# Patient Record
Sex: MALE | Race: WHITE | NOT HISPANIC OR LATINO | Employment: FULL TIME | URBAN - METROPOLITAN AREA
[De-identification: names, ages, dates, MRNs, and addresses within clinical notes are randomized per-mention and may not be internally consistent; named-entity substitution may affect disease eponyms.]

---

## 2017-01-10 ENCOUNTER — TRANSCRIBE ORDERS (OUTPATIENT)
Dept: LAB | Facility: CLINIC | Age: 47
End: 2017-01-10

## 2017-01-10 ENCOUNTER — APPOINTMENT (OUTPATIENT)
Dept: LAB | Facility: CLINIC | Age: 47
End: 2017-01-10
Payer: COMMERCIAL

## 2017-01-10 DIAGNOSIS — E78.00 PURE HYPERCHOLESTEROLEMIA: ICD-10-CM

## 2017-01-10 DIAGNOSIS — Z12.81: Primary | ICD-10-CM

## 2017-01-10 LAB — VENIPUNCTURE: NORMAL

## 2017-01-10 PROCEDURE — 36415 COLL VENOUS BLD VENIPUNCTURE: CPT

## 2021-08-13 ENCOUNTER — APPOINTMENT (EMERGENCY)
Dept: RADIOLOGY | Facility: HOSPITAL | Age: 51
End: 2021-08-13
Payer: COMMERCIAL

## 2021-08-13 ENCOUNTER — HOSPITAL ENCOUNTER (EMERGENCY)
Facility: HOSPITAL | Age: 51
Discharge: HOME | End: 2021-08-13
Attending: EMERGENCY MEDICINE | Admitting: EMERGENCY MEDICINE
Payer: COMMERCIAL

## 2021-08-13 VITALS
HEIGHT: 68 IN | RESPIRATION RATE: 19 BRPM | TEMPERATURE: 98.5 F | SYSTOLIC BLOOD PRESSURE: 165 MMHG | BODY MASS INDEX: 26.83 KG/M2 | HEART RATE: 90 BPM | OXYGEN SATURATION: 97 % | DIASTOLIC BLOOD PRESSURE: 76 MMHG | WEIGHT: 177 LBS

## 2021-08-13 DIAGNOSIS — G51.0 BELL'S PALSY: Primary | ICD-10-CM

## 2021-08-13 LAB — GLUCOSE SERPL-MCNC: 99 MG/DL (ref 65–140)

## 2021-08-13 PROCEDURE — 99284 EMERGENCY DEPT VISIT MOD MDM: CPT

## 2021-08-13 PROCEDURE — 82948 REAGENT STRIP/BLOOD GLUCOSE: CPT

## 2021-08-13 PROCEDURE — 99284 EMERGENCY DEPT VISIT MOD MDM: CPT | Performed by: EMERGENCY MEDICINE

## 2021-08-13 PROCEDURE — 70450 CT HEAD/BRAIN W/O DYE: CPT

## 2021-08-13 RX ORDER — ROSUVASTATIN CALCIUM 10 MG/1
10 TABLET, COATED ORAL DAILY
COMMUNITY

## 2021-08-13 RX ORDER — ALPRAZOLAM 0.25 MG/1
TABLET ORAL AS NEEDED
COMMUNITY

## 2021-08-13 RX ORDER — PREDNISONE 20 MG/1
TABLET ORAL
Qty: 18 TABLET | Refills: 0 | Status: SHIPPED | OUTPATIENT
Start: 2021-08-13 | End: 2021-08-21

## 2021-08-13 RX ORDER — MINERAL OIL AND PETROLATUM 150; 830 MG/G; MG/G
OINTMENT OPHTHALMIC
Qty: 3.5 G | Refills: 0 | Status: SHIPPED | OUTPATIENT
Start: 2021-08-13 | End: 2022-03-15

## 2021-08-13 RX ORDER — SODIUM CHLORIDE 5 %
1 OINTMENT (GRAM) OPHTHALMIC (EYE) DAILY
Qty: 3.5 G | Refills: 0 | Status: SHIPPED | OUTPATIENT
Start: 2021-08-13 | End: 2021-08-13

## 2021-08-13 RX ADMIN — PREDNISONE 50 MG: 20 TABLET ORAL at 19:23

## 2021-08-15 NOTE — ED PROVIDER NOTES
Final Diagnosis:  1  Bell's palsy        Chief Complaint   Patient presents with    Facial Droop     Pt reports at about 1-2pm today noticing toungue feeling numb on right side and right side of mouth noted "weak"  HPI  Patient presents with right facial droop  Started 5 hours prior  No headache or trauma  No single sided weakness  No sensory deficit on his exam  Wrinkles of forehead smoothed, believe to represent Bell's palsy, but subtle on the forehead  Because of uncertainty, stroke alert  strokologist calls and able to view images (see media) and agrees looks like Bell's  Absence of any other neuro deficit on NIH stroke scale and exam  No ear complaints  No known tick bites  - No language barrier    - History obtained from patient  - There are no limitations to the history obtained  - Previous charting underwent limited review with attention to last ED visits, labs, ekgs, and prior imaging  PMH:   has a past medical history of Anxiety and Hypercholesteremia  PSH:   has a past surgical history that includes Cyst Removal and Knee surgery  ROS:  Review of Systems   Constitutional: Negative for chills and fever  HENT: Negative for ear pain and sore throat  Eyes: Negative for pain and visual disturbance  Respiratory: Negative for cough and shortness of breath  Cardiovascular: Negative for chest pain and palpitations  Gastrointestinal: Negative for abdominal pain and vomiting  Genitourinary: Negative for dysuria and hematuria  Musculoskeletal: Negative for arthralgias and back pain  Skin: Negative for color change and rash  Neurological: Positive for facial asymmetry and numbness  Negative for seizures and syncope  All other systems reviewed and are negative         PE:     Physical exam highlights:   Physical Exam   See Nichelle Luevano    Stroke Assessment     Row Name 08/13/21 3553             NIH Stroke Scale    Interval  Baseline      Level of Consciousness (1a )  0      LOC Questions (1b )  0      LOC Commands (1c )  0      Best Gaze (2 )  0      Visual (3 )  0      Facial Palsy (4 )  2      Motor Arm, Left (5a )  0      Motor Arm, Right (5b )  0      Motor Leg, Left (6a )  0      Motor Leg, Right (6b )  0      Limb Ataxia (7 )  0      Sensory (8 )  0      Best Language (9 )  0      Dysarthria (10 )  0      Extinction and Inattention (11 ) (Formerly Neglect)  0      Total  2              Vitals:    08/13/21 1818 08/13/21 1833 08/13/21 1845   BP: (!) 183/95 159/76 165/76   BP Location: Right arm Left arm    Pulse: 85 105 90   Resp: 18 (!) 24 19   Temp:  98 5 °F (36 9 °C)    TempSrc: Tympanic Tympanic    SpO2: 98% 98% 97%   Weight: 80 3 kg (177 lb)     Height: 5' 8" (1 727 m)       Vitals reviewed by me  Nursing note reviewed  Chaperone present for all sensitive exam   Const: No acute distress  Alert  Nontoxic  Not diaphoretic  HEENT: External ears normal  No protrusion drainage swelling  Nose normal  No drainage/traumatic deformity  MMM  Mouth with baseline/symmetric movement  No trismus  Eyes: No squinting  No icterus  Tracks through the room with normal EOM  No tearing/swelling/drainage  Neck: ROM normal  No rigidity  No meningismus  Cards: Rate as per vitals  Compared to monitor sinus unless documented above  Regular  Well perfused  Pulm: able to verbalize without additional effort  Effort and excursion normal  No disress  No audible wheezing/ stridor  Normal resp rate  Abd: No distension beyond baseline  No fluctuant wave  Patient without peritoneal pain with shifting/bumping the bed  MSK: ROM normal and baseline  No deformity  Skin: No new rashes visible  Well perfused  Neuro: Nonfocal  Baseline  CN grossly intact  Moving all four with coordination  Psych: Normal behavior and affect  A:  - Nursing note reviewed      Ddx and MDM  Bell's vs Stroke  Strokologist and myself agree - cancel stroke alert  Jessica Mclaughlin CT unremarkable  Will treat with steroids, f/u with neuro                   ED Course as of Aug 15 1654   Fri Aug 13, 2021   1900 Spoke with neurologist on call  Agree about Garcia's  Will cancel stroke alert and obtain CTH        CT head without contrast   Final Result      No evidence of acute vascular territorial infarction, intracranial hemorrhage or mass effect  I personally discussed this study with Chiqui Pinto on 8/13/2021 at 6:55 PM                            Workstation performed: [id number]           Orders Placed This Encounter   Procedures    CT head without contrast     Labs Reviewed   POCT GLUCOSE - Normal       Result Value Ref Range Status    POC Glucose 99  65 - 140 mg/dl Final       Final Diagnosis:  1  Bell's palsy        P:  - hospital tx includes prednisone  - disposition home  - additional tx intended, if consistent with primary provider steroids  - patient to follow with neuro   - patient will call their PCP to let them know they were in the emergency department  We discuss return precautions     Medications   predniSONE tablet 50 mg (50 mg Oral Given 8/13/21 1923)     Time reflects when diagnosis was documented in both MDM as applicable and the Disposition within this note     Time User Action Codes Description Comment    8/13/2021  7:13 PM Laquita Spears Add [G51 0] Bell's palsy       ED Disposition     ED Disposition Condition Date/Time Comment    Discharge Stable Fri Aug 13, 2021  7:13 PM Lisandro Esteban discharge to home/self care              Follow-up Information     Follow up With Specialties Details Why Contact Info Additional [facility] Schedule an appointment as soon as possible for a visit   [address] [address]  [address] [facility], [address], [place], [place], [address] [phone]        Discharge Medication List as of 8/13/2021  7:22 PM      START taking these medications    Details artificial tear (LUBRIFRESH P M ) 83-15 % ophthalmic ointment Administer to the right eye daily at bedtime, Starting Fri 8/13/2021, Normal      predniSONE 20 mg tablet Multiple Dosages:Starting Fri 8/13/2021, Until Mon 8/16/2021 at 2359, THEN Starting Tue 8/17/2021, Until Wed 8/18/2021 at 2359, THEN Starting Thu 8/19/2021, Until Fri 8/20/2021 at 2359Take 3 tablets (60 mg total) by mouth daily for 4 days, THEN 2 tab lets (40 mg total) daily for 2 days, THEN 1 tablet (20 mg total) daily for 2 days  , Normal         CONTINUE these medications which have NOT CHANGED    Details   ALPRAZolam (XANAX) 0 25 mg tablet Take by mouth as needed for anxiety, Historical Med      Icosapent Ethyl (VASCEPA PO) Take by mouth daily, Historical Med      rosuvastatin (Crestor) 10 MG tablet Take 10 mg by mouth daily, Historical Med           No discharge procedures on file  Prior to Admission Medications   Prescriptions Last Dose Informant Patient Reported? Taking? ALPRAZolam (XANAX) 0 25 mg tablet   Yes Yes   Sig: Take by mouth as needed for anxiety   Icosapent Ethyl (VASCEPA PO)   Yes Yes   Sig: Take by mouth daily   rosuvastatin (Crestor) 10 MG tablet   Yes Yes   Sig: Take 10 mg by mouth daily      Facility-Administered Medications: None       Portions of the record may have been created with voice recognition software  Occasional wrong word or "sound a like" substitutions may have occurred due to the inherent limitations of voice recognition software  Read the chart carefully and recognize, using context, where substitutions have occurred      Electronically signed by:  MD Akil Xavier MD  08/15/21 7606

## 2021-08-19 ENCOUNTER — CONSULT (OUTPATIENT)
Dept: NEUROLOGY | Facility: CLINIC | Age: 51
End: 2021-08-19
Payer: COMMERCIAL

## 2021-08-19 VITALS
BODY MASS INDEX: 26.52 KG/M2 | HEIGHT: 68 IN | HEART RATE: 69 BPM | WEIGHT: 175 LBS | DIASTOLIC BLOOD PRESSURE: 94 MMHG | TEMPERATURE: 98.1 F | SYSTOLIC BLOOD PRESSURE: 153 MMHG

## 2021-08-19 DIAGNOSIS — G51.0 RIGHT-SIDED BELL'S PALSY: Primary | ICD-10-CM

## 2021-08-19 PROCEDURE — 99203 OFFICE O/P NEW LOW 30 MIN: CPT | Performed by: PSYCHIATRY & NEUROLOGY

## 2021-08-19 RX ORDER — FAMCICLOVIR 500 MG/1
500 TABLET, FILM COATED ORAL EVERY 8 HOURS
COMMUNITY
Start: 2021-08-17 | End: 2022-03-15

## 2021-08-19 RX ORDER — MULTIVITAMIN
1 CAPSULE ORAL DAILY
COMMUNITY

## 2021-08-19 NOTE — PROGRESS NOTES
Outpatient Neurology History and Physical  Rose Silva  [id number]  61 y o   1970          Consult: Yes    Maria Eugenia Espinoza,       CC: [facility]         History Obtained from: patient     HPI:     Rose Silva is a 53 yo M with PMH of HLD presents for evaluation of bell's palsy  On 8/13/21, patient gradually noted weakness to right side of mouth around lunch time  He felt his tongue being numb  He has hard time closing his mouth while eating, drinking  He can't close his eyelid  He noticed cold sore inside of right nostril also around the same time  He had gone to ED and was given course of prednisone 60mg for 4 d, 40mg for 2d, 20mg for 1d  His PCP has written course of antiviral for 10 days  He denies any fever, chills  He had Lyme ab drawn yesterday  In early Aug, he had gone to [place] and [place]  for vacation but doesn't recall being in woods  He mainly stayed by beach  Past Medical History:   Diagnosis Date    Anxiety     Hypercholesteremia     Right-sided Bell's palsy                Current Outpatient Medications on File Prior to Visit   Medication Sig Dispense Refill    ALPRAZolam (XANAX) 0 25 mg tablet Take by mouth as needed for anxiety      artificial tear (LUBRIFRESH P M ) 83-15 % ophthalmic ointment Administer to the right eye daily at bedtime 3 5 g 0    famciclovir (FAMVIR) 500 mg tablet Take 500 mg by mouth every 8 (eight) hours      Icosapent Ethyl (VASCEPA PO) Take by mouth daily      Multiple Vitamin (multivitamin) capsule Take 1 capsule by mouth daily      predniSONE 20 mg tablet Take 3 tablets (60 mg total) by mouth daily for 4 days, THEN 2 tablets (40 mg total) daily for 2 days, THEN 1 tablet (20 mg total) daily for 2 days   18 tablet 0    rosuvastatin (Crestor) 10 MG tablet Take 10 mg by mouth daily      [DISCONTINUED] sodium chloride (PAULA 128) 5 % hypertonic ophthalmic ointment Administer 1 drop to the right eye daily 3 5 g 0     No current facility-administered medications on file prior to visit  No Known Allergies      History reviewed  No pertinent family history  Past Surgical History:   Procedure Laterality Date    CYST REMOVAL      KNEE SURGERY             Social History     Socioeconomic History    Marital status: /Civil Union     Spouse name: Not on file    Number of children: Not on file    Years of education: Not on file    Highest education level: Not on file   Occupational History    Not on file   Tobacco Use    Smoking status: Former Smoker    Smokeless tobacco: Never Used   Vaping Use    Vaping Use: Never used   Substance and Sexual Activity    Alcohol use: Yes     Comment: rare    Drug use: Not Currently    Sexual activity: Not on file   Other Topics Concern    Not on file   Social History Narrative    Not on file     Social Determinants of Health     Financial Resource Strain:     Difficulty of Paying Living Expenses:    Food Insecurity:     Worried About Running Out of Food in the Last Year:     [address] in the Last Year:    Transportation Needs:     Lack of Transportation (Medical):      Lack of Transportation (Non-Medical):    Physical Activity:     Days of Exercise per Week:     Minutes of Exercise per Session:    Stress:     Feeling of Stress :    Social Connections:     Frequency of Communication with Friends and Family:     Frequency of Social Gatherings with Friends and Family:     Attends [employer] Services:     Active Member of Clubs or Organizations:     Attends Club or Organization Meetings:     Marital Status:    Intimate Partner Violence:     Fear of Current or Ex-Partner:     Emotionally Abused:     Physically Abused:     Sexually Abused:        Review of Systems  Refer to positive review of systems in HPI  Constitutional- No fever  Eyes- No visual change  ENT- Hearing normal  CV- No chest pain  Resp- No Shortness of breath  GI- No diarrhea  - Bladder normal  MS- No Arthritis Skin- No rash  Psych- No depression  Endo- No DM  Heme- No nodes    PHYSICAL EXAM:    Vitals:    08/19/21 1020   BP: 153/94   BP Location: Left arm   Patient Position: Sitting   Cuff Size: Adult   Pulse: 69   Temp: 98 1 °F (36 7 °C)   Weight: 79 4 kg (175 lb)   Height: 5' 8" (1 727 m)         Appearance: No Acute Distress  Ophthalmoscopic: Disc Flat, Normal fundus  Carotid/Heart/Peripheral Vascular: No Bruits, RRR  Orientation: Awake, Alert, and Oriented x 3  Mental status:  Memory: Registation 3/3 Recall 3/3  Attention: Normal  Knowledge: Appropriate  Language: No aphasia  Speech: No dysarthria  Cranial Nerves:  2 No Visual Defect on Confrontation; Pupils round, equal, reactive to light  3,4,6 Extraocular Movements Intact; no nystagmus  5 Facial Sensation Intact  7 right facial peripheral asymmetry  Can't close right eyelid  Can't make wrinkles to right side of forehead  Right buccal muscle weakness  8 Intact hearing  9,10 Palate symmetric, normal gag  11 Good shoulder shrug  12 Tongue Midline  Gait: Stable, No ataxia, can perform tandem walking  Coordination: No ataxia with finger to nose testing and heel to shin testing  Sensory: Intact, Symmetric to Pinprick, Light Touch, Vibration, and Joint Position  Muscle Tone: Normal  Muscle exam  Arm Right Left Leg Right Left   Deltoid 5/5 5/5 Iliopsoas 5/5 5/5   Biceps 5/5 5/5 Quads 5/5 5/5   Triceps 5/5 5/5 Hamstrings 5/5 5/5   Wrist Extension 5/5 5/5 Ankle Dorsi Flexion 5/5 5/5   Wrist Flexion 5/5 5/5 Ankle Plantar Flexion 5/5 5/5   Interossei 5/5 5/5 Ankle Eversion 5/5 5/5   APB 5/5 5/5 Ankle Inversion 5/5 5/5       Reflexes   RJ BJ TJ KJ AJ Plantars Jackson's   Right 2+ 2+ 2+ 2+ 2+ Downgoing Not present   Left 2+ 2+ 2+ 2+ 2+ Downgoing Not present           Personal review of           Assessment/Plan:     1  Right-sided Bell's palsy         Patient appears to have classic peripheral right Bell's palsy  He already has prednisone and antiviral course prescribed  He's arranged to start PT  Discussed minimizing stress level for better recovery  He's using artificial tears, lacrilube  He tapes his eyelid shut at night  Discussed ok to use clean eye patch if easier  If should recover fully within next 2 months  If not, then will consider obtaining MRI brain  At present, it won't   Counseling Documentation:  The patient and/or patient's family were  counseled regarding diagnostic results  Instructions for management,risk factor reductions,prognosis of disease were discussed  Patient and family were educated regarding impressions,risks and benefits of treatment options,importance of compliance with treatment  Total time of encounter: 35 min  More than 50% of time was spent in counseling and coordination of care of patient  DANITZA Grewal [facility]  [address]  [address]

## 2021-08-24 ENCOUNTER — EVALUATION (OUTPATIENT)
Dept: PHYSICAL THERAPY | Facility: CLINIC | Age: 51
End: 2021-08-24
Payer: COMMERCIAL

## 2021-08-24 DIAGNOSIS — G51.0 BELL'S PALSY: Primary | ICD-10-CM

## 2021-08-24 PROCEDURE — 97162 PT EVAL MOD COMPLEX 30 MIN: CPT

## 2021-08-24 PROCEDURE — 97032 APPL MODALITY 1+ESTIM EA 15: CPT

## 2021-08-24 NOTE — PROGRESS NOTES
PT Evaluation     Today's date: 2021  Patient name: Tobin Still  : 1970  MRN: [id number]  Referring provider: Tamiko Damico DO  Dx:   Encounter Diagnosis     ICD-10-CM    1  Bell's palsy  G51 0        Start Time: 1098  Stop Time: 1335  Total time in clinic (min): 65 minutes    Assessment  Assessment details: 54year old male with recent onset of Bell's Palsy R, displays mild facial drooping at rest   Pt presents with significant weakness of facial muscles as outlined in objective section  Pt is using tape at night, eye drops for eye health  Pt must use caution with eating due to poor movement of food on the right side  Home exercise program and suggestions were issued today, see copy in media  Advise continued PT for active motion training for muscles that are firing at least trace, and ES for muscles not activly firing  Pt in agreement with this plan to continue PT once weekly  Impairments: abnormal muscle firing, abnormal muscle tone, abnormal or restricted ROM, impaired physical strength and lacks appropriate home exercise program  Understanding of Dx/Px/POC: excellent  Goals  STG 1-4 weeks    1  Pt will be independent in early HEP  2  Pt will begin to chew some foods on the right safely  LTG 4-8 weeks   1  Pt will be able to hold large puff of air in cheeks without leakage  2 Pt will be independent with advanced HEP  3  3/5 muscle strength for all facial muslces  4  Near normal resting facial symmetry  5  Pt will masticate and move food normally for safe eating         Plan  Planned modality interventions: manual electrical stimulation  Planned therapy interventions: manual therapy, massage, ADL retraining, activity modification, home exercise program, therapeutic exercise, therapeutic activities, neuromuscular re-education and patient education  Frequency: 1x week  Duration in weeks: 12  Plan of Care beginning date: 2021  Plan of Care expiration date: 2021  Treatment plan discussed with: patient        Subjective Evaluation    History of Present Illness  Mechanism of injury: 54year old male reports onset of Bell's Palsy 21  He first noted numbness on half of his tongue, and facial weakness  He went to the ER, CT negative for stroke  Steroids were initiated  2-3 days later he went to PCP  Antiviral was started  He has been seen by Neurology, Dr Rayna Howe  Eating: small pieces of food  He cannot bite into a sandwich  He is eating soft foods  Drinking from a cup: drooling if he is not careful  Straw:   Must position it on the left side  Talking: feels he has a minor slur  Eye closure:  Not complete  Nightime: eye drops during the day  Night taping his eye closed  Pain less after steroids  After completion of steroids, he notes increase in pain/stabbing sensation behind and inside  R  ear  Advil reduces it  Sensation: mild numbness persists tongue R side     Dizziness:  None  Imbalance: none     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    Pain  Current pain ratin  At best pain ratin  At worst pain ratin  Quality: needle-like and pressure  Relieving factors: medications    Treatments  No previous or current treatments  Patient Goals  Patient goal: restore facial muscle movement        Objective     Functional Assessment        Comments  Resting symmetry:  Right side of mouth lower than L, R eyebrow slighly lower  Pt wearing a breath-rite strip, feels R nostril is slightly collapsed  Muscle activation    Frontalis: slight activation, slight movement R eyebrow  Eye closurse : r eye remains open 1/8-1/4 of inch  Tolerates no resistance   Smile lips closed:  No movement noted corner of R mouth  Smile lips open:  No R teeth visible  Pout: bottom lip moves symmetrical, appears full R vs L, but upper lip no movement  Puff of air in cheeks: small amount of air held only    Flare nostril: no activation R  Tongue: protrudes slightly R     EStim 5s x 10 reps to:  Frontalis  Orbicularis oris  Flare of nostril  Furrow eyebrows               Precautions:   Past Medical History:   Diagnosis Date    Anxiety     Hypercholesteremia     Right-sided Bell's palsy          Manuals                                                                 Neuro Re-Ed                                                                                                        Ther Ex                                                                                                                     Ther Activity                                       Gait Training                                       Modalities

## 2021-08-24 NOTE — LETTER
2021    Maria Eugenia Espinoza, [address] [address]    Patient: Rose Silva   YOB: 1970   Date of Visit: 2021     Encounter Diagnosis     ICD-10-CM    1  Bell's palsy  G51 0        Dear Dr Yuliya Hernandez: Thank you for your recent referral of Rose Silva  Please review the attached evaluation summary from [address] [place] [address] recent visit  Please verify that you agree with the plan of care by signing the attached order  If you have any questions or concerns, please do not hesitate to call  I sincerely appreciate the opportunity to share in the care of one of your patients and hope to have another opportunity to work with you in the near future  Sincerely,    Sherlyn Streeter, PT      Referring Provider:      I certify that I have read the below Plan of Care and certify the need for these services furnished under this plan of treatment while under my care  Maria Eugenia Espinoza, [address] [address]  Via Fax: [phone]            PT Evaluation     Today's date: 2021  Patient name: Rose Silva  : 1970  MRN: [id number]  Referring provider: Itzel Vargas DO  Dx:   Encounter Diagnosis     ICD-10-CM    1  Bell's palsy  G51 0        Start Time: 6870  Stop Time: 1335  Total time in clinic (min): 65 minutes    Assessment  Assessment details: 54year old male with recent onset of Bell's Palsy R, displays mild facial drooping at rest   Pt presents with significant weakness of facial muscles as outlined in objective section  Pt is using tape at night, eye drops for eye health  Pt must use caution with eating due to poor movement of food on the right side  Home exercise program and suggestions were issued today, see copy in media  Advise continued PT for active motion training for muscles that are firing at least trace, and ES for muscles not activly firing    Pt in agreement with this plan to continue PT once weekly  Impairments: abnormal muscle firing, abnormal muscle tone, abnormal or restricted ROM, impaired physical strength and lacks appropriate home exercise program  Understanding of Dx/Px/POC: excellent  Goals  STG 1-4 weeks    1  Pt will be independent in early HEP  2  Pt will begin to chew some foods on the right safely  LTG 4-8 weeks   1  Pt will be able to hold large puff of air in cheeks without leakage  2 Pt will be independent with advanced HEP  3  3/5 muscle strength for all facial muslces  4  Near normal resting facial symmetry  5  Pt will masticate and move food normally for safe eating  Plan  Planned modality interventions: manual electrical stimulation  Planned therapy interventions: manual therapy, massage, ADL retraining, activity modification, home exercise program, therapeutic exercise, therapeutic activities, neuromuscular re-education and patient education  Frequency: 1x week  Duration in weeks: 12  Plan of Care beginning date: 8/24/2021  Plan of Care expiration date: 11/22/2021  Treatment plan discussed with: patient        Subjective Evaluation    History of Present Illness  Mechanism of injury: 54year old male reports onset of Bell's Palsy 8/13/21  He first noted numbness on half of his tongue, and facial weakness  He went to the ER, CT negative for stroke  Steroids were initiated  2-3 days later he went to PCP  Antiviral was started  He has been seen by Neurology, Dr Fidelia Vizcarra  Eating: small pieces of food  He cannot bite into a sandwich  He is eating soft foods  Drinking from a cup: drooling if he is not careful  Straw:   Must position it on the left side  Talking: feels he has a minor slur  Eye closure:  Not complete  Nightime: eye drops during the day  Night taping his eye closed  Pain less after steroids  After completion of steroids, he notes increase in pain/stabbing sensation behind and inside  R  ear  Advil reduces it     Sensation: mild numbness persists tongue R side     Dizziness:  None  Imbalance: none     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    Pain  Current pain ratin  At best pain ratin  At worst pain ratin  Quality: needle-like and pressure  Relieving factors: medications    Treatments  No previous or current treatments  Patient Goals  Patient goal: restore facial muscle movement        Objective     Functional Assessment        Comments  Resting symmetry:  Right side of mouth lower than L, R eyebrow slighly lower  Pt wearing a breath-rite strip, feels R nostril is slightly collapsed  Muscle activation    Frontalis: slight activation, slight movement R eyebrow  Eye closurse : r eye remains open 1/8-1/4 of inch  Tolerates no resistance   Smile lips closed:  No movement noted corner of R mouth  Smile lips open:  No R teeth visible  Pout: bottom lip moves symmetrical, appears full R vs L, but upper lip no movement  Puff of air in cheeks: small amount of air held only    Flare nostril: no activation R  Tongue: protrudes slightly R     EStim 5s x 10 reps to:  Frontalis  Orbicularis oris  Flare of nostril  Furrow eyebrows               Precautions:   Past Medical History:   Diagnosis Date    Anxiety     Hypercholesteremia     Right-sided Bell's palsy          Manuals                                                                 Neuro Re-Ed                                                                                                        Ther Ex                                                                                                                     Ther Activity                                       Gait Training                                       Modalities

## 2021-08-30 ENCOUNTER — APPOINTMENT (OUTPATIENT)
Dept: PHYSICAL THERAPY | Facility: CLINIC | Age: 51
End: 2021-08-30
Payer: COMMERCIAL

## 2021-09-28 NOTE — PROGRESS NOTES
PT DC NOTE  Pt called to cancel remaining PT appointments, stating he was transferring to an [facility] location closer to home  He attended PT evaluation only at this location  Continued PT was advised

## 2022-01-14 ENCOUNTER — TELEPHONE (OUTPATIENT)
Dept: HEMATOLOGY ONCOLOGY | Facility: CLINIC | Age: 52
End: 2022-01-14

## 2022-01-14 NOTE — TELEPHONE ENCOUNTER
New Patient Intake Form   Patient Details:    Sharon Osman  1970  [id number]    Appointment Information   Who is calling to schedule? Spouse   If not self, what is the caller's name? Please put name of [facility] nurse as well  Pio Gandhi   Referring provider Dr Glynn   What is the diagnosis? Abnormal labs   Is there a confirmed tissue diagnosis? No   Is patient aware of diagnosis? Yes   Have you had any imaging or labs done? If yes, where? (If imaging done outside of [facility], please remind patient to bring a disk ) Yes     [facility]        Have you been seen by another Oncologist/Hematologist?  If so, who and where? no   Are the records in [facility] or Care Everywhere? no   Are records needed from an outside facility? Yes    If yes, Name of facility, city and state where facility is located  Dr Zaira Samson   Is the patient willing to be seen by another provider?   (This is for breast patients only) n/a   Miscellaneous Information:    Dr Glynn's office will be faxing over most recent note and labs over to [phone] appt made for 02/08

## 2022-01-25 ENCOUNTER — DOCUMENTATION (OUTPATIENT)
Dept: HEMATOLOGY ONCOLOGY | Facility: MEDICAL CENTER | Age: 52
End: 2022-01-25

## 2022-01-26 ENCOUNTER — CONSULT (OUTPATIENT)
Dept: HEMATOLOGY ONCOLOGY | Facility: MEDICAL CENTER | Age: 52
End: 2022-01-26
Payer: COMMERCIAL

## 2022-01-26 VITALS
RESPIRATION RATE: 18 BRPM | BODY MASS INDEX: 26.52 KG/M2 | DIASTOLIC BLOOD PRESSURE: 82 MMHG | TEMPERATURE: 97.3 F | SYSTOLIC BLOOD PRESSURE: 122 MMHG | HEIGHT: 68 IN | OXYGEN SATURATION: 98 % | HEART RATE: 87 BPM | WEIGHT: 175 LBS

## 2022-01-26 DIAGNOSIS — R89.9 ABNORMAL LABORATORY TEST: Primary | ICD-10-CM

## 2022-01-26 PROCEDURE — 99204 OFFICE O/P NEW MOD 45 MIN: CPT | Performed by: INTERNAL MEDICINE

## 2022-01-26 NOTE — PROGRESS NOTES
Anthony Castle  1970  [facility] [address] [address]  HEMATOLOGY/ONCOLOGY CONSULTATION REPORT    DISCUSSION/SUMMARY:    57-year-old male with history of IBS, present issues with constipation recently undergoing blood work demonstrating an abnormal albumin/globulin ratio  The albumin level, globulin level and total protein level are otherwise within normal limits  Mr Livia Cuadra feels relatively well; clinically there are no concerning findings  From a hematology standpoint, no additional workup is needed  Patient and Dr Vikash Cobos can periodically recheck the CMP looking for abnormal progression  Patient has a pending appointment with GI; Mr Livia Cuadra understands that this appointment is very important - the constipation needs to be improved/reversed so that patient does not continue to have blood in his stools  Return to Hematology is on a prn basis but Mr Livia Cuadra knows to call the hematology/oncology office if there are any other questions or concerns  Carefully review your medication list and verify that the list is accurate and up-to-date  Please call the hematology/oncology office if there are medications missing from the list, medications on the list that you are not currently taking or if there is a dosage or instruction that is different from how you're taking that medication  Patient goals and areas of care: Follow-up with PCP  Barriers to care:  None  Patient is able to self-care   ______________________________________________________________________________________    Chief Complaint   Patient presents with    Consult     Abnormal laboratory test results     History of Present Illness:  57-year-old male referred for an abnormal albumin/globulin ratio  Presently Mr Livia Cuadra states feeling okay, patient's biggest complaint is chronic constipation  Patient has a history of IBS    Occasionally Mr Livia Cuadra sees blood in his stools from pushing  Appetite is otherwise good, weight is stable  No  issues  Activities are baseline  No headaches, blurred vision or dizziness, no respiratory issues  Routine health maintenance and medical care is otherwise up-to-date  Review of Systems   Constitutional: Negative  HENT: Negative  Eyes: Negative  Respiratory: Negative  Cardiovascular: Negative  Gastrointestinal: Positive for constipation  Endocrine: Negative  Genitourinary: Negative  Musculoskeletal: Negative  Skin: Negative  Allergic/Immunologic: Negative  Neurological: Negative  Hematological: Negative  Psychiatric/Behavioral: Negative  All other systems reviewed and are negative  There is no problem list on file for this patient      Past Medical History:   Diagnosis Date    Anxiety     Hypercholesteremia     Right-sided Bell's palsy      Past Surgical History:   Procedure Laterality Date    CYST REMOVAL      KNEE SURGERY Right 2018    infection     Family History   Adopted: Yes   Problem Relation Age of Onset    Tuberculosis Mother    Family history:  No known familial or genetic diseases    Social History     Socioeconomic History    Marital status: /Civil Union     Spouse name: Not on file    Number of children: Not on file    Years of education: Not on file    Highest education level: Not on file   Occupational History    Not on file   Tobacco Use    Smoking status: Former Smoker     Quit date: 8/24/2006     Years since quitting: 15 4    Smokeless tobacco: Never Used   Vaping Use    Vaping Use: Never used   Substance and Sexual Activity    Alcohol use: Yes     Comment: rare    Drug use: Not Currently    Sexual activity: Not on file   Other Topics Concern    Not on file   Social History Narrative    Not on file     Social Determinants of Health     Financial Resource Strain: Not on file   Food Insecurity: Not on file   Transportation Needs: Not on file   Physical Activity: Not on file   Stress: Not on file   Social Connections: Not on file   Intimate Partner Violence: Not on file   Housing Stability: Not on file   Social history:  Discontinued tobacco use 25 years ago, no drug or alcohol abuse, no toxic exposure    Current Outpatient Medications:     ALPRAZolam (XANAX) 0 25 mg tablet, Take by mouth as needed for anxiety, Disp: , Rfl:     artificial tear (LUBRIFRESH P M ) 83-15 % ophthalmic ointment, Administer to the right eye daily at bedtime, Disp: 3 5 g, Rfl: 0    famciclovir (FAMVIR) 500 mg tablet, Take 500 mg by mouth every 8 (eight) hours, Disp: , Rfl:     Icosapent Ethyl (VASCEPA PO), Take by mouth daily, Disp: , Rfl:     Multiple Vitamin (multivitamin) capsule, Take 1 capsule by mouth daily, Disp: , Rfl:     rosuvastatin (Crestor) 10 MG tablet, Take 10 mg by mouth daily, Disp: , Rfl:     No Known Allergies    Vitals:    01/26/22 1538   BP: 122/82   Pulse: 87   Resp: 18   Temp: (!) 97 3 °F (36 3 °C)   SpO2: 98%     Physical Exam  Constitutional:       Appearance: He is well-developed  Comments: Well-nourished male, no respiratory distress, no signs of pain   HENT:      Head: Normocephalic and atraumatic  Right Ear: External ear normal       Left Ear: External ear normal    Eyes:      Conjunctiva/sclera: Conjunctivae normal       Pupils: Pupils are equal, round, and reactive to light  Cardiovascular:      Rate and Rhythm: Normal rate and regular rhythm  Heart sounds: Normal heart sounds  Pulmonary:      Effort: Pulmonary effort is normal       Breath sounds: Normal breath sounds  Comments: Clear bilaterally  Abdominal:      General: Bowel sounds are normal       Palpations: Abdomen is soft  Musculoskeletal:         General: Normal range of motion  Cervical back: Normal range of motion and neck supple  Skin:     General: Skin is warm        Comments: Warm, moist, no petechiae or ecchymoses, no purpura   Neurological: Mental Status: He is alert and oriented to person, place, and time  Deep Tendon Reflexes: Reflexes are normal and symmetric  Psychiatric:         Behavior: Behavior normal          Thought Content:  Thought content normal          Judgment: Judgment normal      Extremities:  No lower extremity edema bilaterally, no cords, pulses are 2+  Lymphatics:  No adenopathy in the neck, supraclavicular region, axilla, groin bilaterally    Labs    No CBC results for review    01/13/2022 BUN = 15 creatinine = 0 95 calcium = 9 6 total protein = 6 7 albumin = 4 9 globulin = 1 8 a/G ratio = 2 7 = elevated alkaline phosphatase = 41 AST = 33 ALT = 43 TSH = 1 600

## 2022-02-01 ENCOUNTER — OFFICE VISIT (OUTPATIENT)
Dept: GASTROENTEROLOGY | Facility: CLINIC | Age: 52
End: 2022-02-01
Payer: COMMERCIAL

## 2022-02-01 VITALS
DIASTOLIC BLOOD PRESSURE: 96 MMHG | HEIGHT: 68 IN | SYSTOLIC BLOOD PRESSURE: 145 MMHG | WEIGHT: 174 LBS | BODY MASS INDEX: 26.37 KG/M2 | HEART RATE: 88 BPM

## 2022-02-01 DIAGNOSIS — R74.8 LOW SERUM ALKALINE PHOSPHATASE: ICD-10-CM

## 2022-02-01 DIAGNOSIS — Z12.11 COLON CANCER SCREENING: Primary | ICD-10-CM

## 2022-02-01 DIAGNOSIS — K58.1 IRRITABLE BOWEL SYNDROME WITH CONSTIPATION: ICD-10-CM

## 2022-02-01 PROCEDURE — 99204 OFFICE O/P NEW MOD 45 MIN: CPT | Performed by: INTERNAL MEDICINE

## 2022-02-01 NOTE — PROGRESS NOTES
[facility] - Outpatient Consultation  Osvaldo Merida 54 y o  male MRN: [id number]  Encounter: [id number]        ASSESSMENT AND PLAN:      1  Colon cancer screening  Will plan colonoscopy    - Colonoscopy; Future  - PAT Covid Screening; Future  - COVID only; Future    2  Irritable bowel syndrome with constipation  Will maximize dietary fiber intake  3  Low alkaline phosphatase  This is mildly decreased and likely insignificant  No suggestion of malnutrition, no evidence of Kayser-Fleischer rings  LFTs otherwise normal   This can rarely be seen with Crohn's disease  Will evaluate at the time of colonoscopy  ______________________________________________________________________    HPI:  Patient with history of constipation predominant IBS for most of his life which causes episodes abdominal discomfort, malaise, change in bowel function without blood presents to discuss colonoscopy  Recently underwent laboratory testing revealing a slightly low alkaline phosphatase of 41 with lower limit of normal 44  Was previously normal in 2021  He did undergo colonoscopy previously approximately 15 years ago to evaluate IBS and this was normal by his report  No known family history of gastrointestinal or liver disease  He is adopted but recently learned his biological mother  of tuberculosis    REVIEW OF SYSTEMS:    Review of Systems   Gastrointestinal: Positive for bloating, abdominal pain, change in bowel habit and constipation  Negative for hematochezia, jaundice, melena, nausea and vomiting  All other systems reviewed and are negative         Historical Information   Past Medical History:   Diagnosis Date    Anxiety     Hypercholesteremia     Irritable bowel syndrome     Right-sided Bell's palsy      Past Surgical History:   Procedure Laterality Date    COLONOSCOPY      CYST REMOVAL      KNEE SURGERY Right 2018    infection     Social History   Social History Substance and Sexual Activity   Alcohol Use Yes    Comment: rare     Social History     Substance and Sexual Activity   Drug Use Not Currently     Social History     Tobacco Use   Smoking Status Former Smoker    Quit date: 8/24/2006    Years since quitting: 15 4   Smokeless Tobacco Never Used     Family History   Adopted: Yes   Problem Relation Age of Onset    Tuberculosis Mother        Meds/Allergies       Current Outpatient Medications:     ALPRAZolam (XANAX) 0 25 mg tablet    Icosapent Ethyl (VASCEPA PO)    Multiple Vitamin (multivitamin) capsule    rosuvastatin (Crestor) 10 MG tablet    artificial tear (LUBRIFRESH P M ) 83-15 % ophthalmic ointment    famciclovir (FAMVIR) 500 mg tablet    No Known Allergies        Objective     Blood pressure 145/96, pulse 88, height 5' 8" (1 727 m), weight 78 9 kg (174 lb)  Body mass index is 26 46 kg/m²  PHYSICAL EXAM:      Physical Exam  Vitals and nursing note reviewed  Constitutional:       General: He is not in acute distress  HENT:      Head: Normocephalic and atraumatic  Eyes:      General: No scleral icterus  Pupils: Pupils are equal, round, and reactive to light  Cardiovascular:      Rate and Rhythm: Normal rate and regular rhythm  Pulmonary:      Effort: Pulmonary effort is normal  No respiratory distress  Abdominal:      General: There is no distension  Musculoskeletal:         General: Normal range of motion  Cervical back: Normal range of motion and neck supple  Skin:     General: Skin is dry  Neurological:      General: No focal deficit present  Mental Status: He is alert and oriented to person, place, and time  Psychiatric:         Mood and Affect: Mood normal          Behavior: Behavior normal               Lab Results:   No visits with results within 1 Day(s) from this visit     Latest known visit with results is:   Admission on 08/13/2021, Discharged on 08/13/2021   Component Date Value    POC Glucose 08/13/2021 99          Radiology Results:   No results found

## 2022-02-01 NOTE — PATIENT INSTRUCTIONS
Scheduled date of colonoscopy (as of today): 3/21/22  Physician performing colonoscopy: Dr Annette Carlos  Location of colonoscopy: [facility]  Bowel prep reviewed with patient: Miralax/Dulcolax  Instructions reviewed with patient by: Karina  Clearances:  N/A

## 2022-03-21 ENCOUNTER — ANESTHESIA (OUTPATIENT)
Dept: GASTROENTEROLOGY | Facility: AMBULARY SURGERY CENTER | Age: 52
End: 2022-03-21

## 2022-03-21 ENCOUNTER — HOSPITAL ENCOUNTER (OUTPATIENT)
Dept: GASTROENTEROLOGY | Facility: AMBULARY SURGERY CENTER | Age: 52
Setting detail: OUTPATIENT SURGERY
Discharge: HOME | End: 2022-03-21
Attending: INTERNAL MEDICINE
Payer: COMMERCIAL

## 2022-03-21 ENCOUNTER — ANESTHESIA EVENT (OUTPATIENT)
Dept: GASTROENTEROLOGY | Facility: AMBULARY SURGERY CENTER | Age: 52
End: 2022-03-21

## 2022-03-21 VITALS
HEART RATE: 73 BPM | TEMPERATURE: 97.5 F | OXYGEN SATURATION: 97 % | HEIGHT: 68 IN | WEIGHT: 174 LBS | DIASTOLIC BLOOD PRESSURE: 85 MMHG | SYSTOLIC BLOOD PRESSURE: 141 MMHG | BODY MASS INDEX: 26.37 KG/M2 | RESPIRATION RATE: 18 BRPM

## 2022-03-21 DIAGNOSIS — Z12.11 COLON CANCER SCREENING: ICD-10-CM

## 2022-03-21 PROBLEM — E78.5 HYPERLIPIDEMIA: Status: ACTIVE | Noted: 2022-03-21

## 2022-03-21 PROCEDURE — 45380 COLONOSCOPY AND BIOPSY: CPT | Performed by: INTERNAL MEDICINE

## 2022-03-21 PROCEDURE — 88305 TISSUE EXAM BY PATHOLOGIST: CPT | Performed by: SPECIALIST

## 2022-03-21 PROCEDURE — 45385 COLONOSCOPY W/LESION REMOVAL: CPT | Performed by: INTERNAL MEDICINE

## 2022-03-21 RX ORDER — LIDOCAINE HYDROCHLORIDE 10 MG/ML
INJECTION, SOLUTION EPIDURAL; INFILTRATION; INTRACAUDAL; PERINEURAL AS NEEDED
Status: DISCONTINUED | OUTPATIENT
Start: 2022-03-21 | End: 2022-03-21

## 2022-03-21 RX ORDER — PROPOFOL 10 MG/ML
INJECTION, EMULSION INTRAVENOUS CONTINUOUS PRN
Status: DISCONTINUED | OUTPATIENT
Start: 2022-03-21 | End: 2022-03-21

## 2022-03-21 RX ORDER — SODIUM CHLORIDE, SODIUM LACTATE, POTASSIUM CHLORIDE, CALCIUM CHLORIDE 600; 310; 30; 20 MG/100ML; MG/100ML; MG/100ML; MG/100ML
INJECTION, SOLUTION INTRAVENOUS CONTINUOUS PRN
Status: DISCONTINUED | OUTPATIENT
Start: 2022-03-21 | End: 2022-03-21

## 2022-03-21 RX ORDER — SODIUM CHLORIDE, SODIUM LACTATE, POTASSIUM CHLORIDE, CALCIUM CHLORIDE 600; 310; 30; 20 MG/100ML; MG/100ML; MG/100ML; MG/100ML
75 INJECTION, SOLUTION INTRAVENOUS CONTINUOUS
Status: DISCONTINUED | OUTPATIENT
Start: 2022-03-21 | End: 2022-03-25 | Stop reason: HOSPADM

## 2022-03-21 RX ORDER — PROPOFOL 10 MG/ML
INJECTION, EMULSION INTRAVENOUS AS NEEDED
Status: DISCONTINUED | OUTPATIENT
Start: 2022-03-21 | End: 2022-03-21

## 2022-03-21 RX ADMIN — PROPOFOL 50 MG: 10 INJECTION, EMULSION INTRAVENOUS at 09:21

## 2022-03-21 RX ADMIN — LIDOCAINE HYDROCHLORIDE 50 MG: 10 INJECTION, SOLUTION EPIDURAL; INFILTRATION; INTRACAUDAL; PERINEURAL at 09:20

## 2022-03-21 RX ADMIN — PROPOFOL 130 MCG/KG/MIN: 10 INJECTION, EMULSION INTRAVENOUS at 09:22

## 2022-03-21 RX ADMIN — PROPOFOL 100 MG: 10 INJECTION, EMULSION INTRAVENOUS at 09:20

## 2022-03-21 RX ADMIN — PROPOFOL 50 MG: 10 INJECTION, EMULSION INTRAVENOUS at 09:22

## 2022-03-21 RX ADMIN — SODIUM CHLORIDE, SODIUM LACTATE, POTASSIUM CHLORIDE, AND CALCIUM CHLORIDE 75 ML/HR: .6; .31; .03; .02 INJECTION, SOLUTION INTRAVENOUS at 08:44

## 2022-03-21 RX ADMIN — SODIUM CHLORIDE, SODIUM LACTATE, POTASSIUM CHLORIDE, AND CALCIUM CHLORIDE: .6; .31; .03; .02 INJECTION, SOLUTION INTRAVENOUS at 09:17

## 2022-03-21 NOTE — ANESTHESIA PREPROCEDURE EVALUATION
Procedure:  COLONOSCOPY    Relevant Problems   CARDIO   (+) Hyperlipidemia      NEURO/PSYCH   (+) Anxiety      Other   (+) Irritable bowel syndrome        Physical Exam    Airway    Mallampati score: II  TM Distance: >3 FB  Neck ROM: full     Dental       Cardiovascular  Rhythm: regular, Rate: normal,     Pulmonary  Breath sounds clear to auscultation,     Other Findings        Anesthesia Plan  ASA Score- 2     Anesthesia Type- IV sedation with anesthesia with ASA Monitors  Additional Monitors:   Airway Plan:           Plan Factors-    Chart reviewed  Patient is not a current smoker  Induction- intravenous  Postoperative Plan-     Informed Consent- Anesthetic plan and risks discussed with patient  I personally reviewed this patient with the CRNA  Discussed and agreed on the Anesthesia Plan with the CRNA  Michael Alejandro

## 2022-03-21 NOTE — H&P
History and Physical - [facility]  Ariella Fletcher 54 y o  male MRN: [id number]                  HPI: Ariella Fletcher is a 54y o  year old male who presents for average risk colorectal cancer screening      REVIEW OF SYSTEMS: Per the HPI, and otherwise unremarkable  Historical Information   Past Medical History:   Diagnosis Date    Anxiety     Hypercholesteremia     Irritable bowel syndrome     Right-sided Bell's palsy      Past Surgical History:   Procedure Laterality Date    COLONOSCOPY      CYST REMOVAL      KNEE SURGERY Right 2018    infection     Social History   Social History     Substance and Sexual Activity   Alcohol Use Yes    Comment: rare     Social History     Substance and Sexual Activity   Drug Use Not Currently     Social History     Tobacco Use   Smoking Status Former Smoker    Quit date: 8/24/2006    Years since quitting: 15 5   Smokeless Tobacco Never Used     Family History   Adopted: Yes   Problem Relation Age of Onset    Tuberculosis Mother        Meds/Allergies       Current Outpatient Medications:     ALPRAZolam (XANAX) 0 25 mg tablet    Icosapent Ethyl (VASCEPA PO)    Multiple Vitamin (multivitamin) capsule    rosuvastatin (Crestor) 10 MG tablet    Current Facility-Administered Medications:     lactated ringers infusion, 75 mL/hr, Intravenous, Continuous, 75 mL/hr at 03/21/22 0844    No Known Allergies    Objective     /88   Pulse 95   Temp 97 5 °F (36 4 °C) (Temporal)   Resp 18   Ht 5' 8" (1 727 m)   Wt 78 9 kg (174 lb)   SpO2 98%   BMI 26 46 kg/m²       PHYSICAL EXAM    Gen: NAD  Head: NCAT  CV: RRR  CHEST: Clear  ABD: soft, NT/ND  EXT: no edema      ASSESSMENT/PLAN:  This is a 54y o  year old male here for colonoscopy, and he is stable and optimized for his procedure

## 2022-03-21 NOTE — ANESTHESIA POSTPROCEDURE EVALUATION
Post-Op Assessment Note    CV Status:  Stable  Pain Score: 0    Pain management: adequate     Mental Status:  Sleepy   Hydration Status:  Stable   PONV Controlled:  None   Airway Patency:  Patent   Two or more mitigation strategies used for obstructive sleep apnea   Post Op Vitals Reviewed: Yes      Staff: CRNA         No complications documented      BP   110/61   Temp 97   Pulse 65   Resp 16   SpO2 99

## 2022-04-14 NOTE — RESULT ENCOUNTER NOTE
Please call the patient regarding his result  Colon polyps were benign    Repeat colonoscopy in 5 years

## 2022-11-23 ENCOUNTER — APPOINTMENT (OUTPATIENT)
Dept: RADIOLOGY | Facility: CLINIC | Age: 52
End: 2022-11-23

## 2022-11-23 VITALS
DIASTOLIC BLOOD PRESSURE: 85 MMHG | WEIGHT: 179 LBS | SYSTOLIC BLOOD PRESSURE: 127 MMHG | HEIGHT: 68 IN | HEART RATE: 67 BPM | BODY MASS INDEX: 27.13 KG/M2

## 2022-11-23 DIAGNOSIS — M25.512 LEFT SHOULDER PAIN, UNSPECIFIED CHRONICITY: ICD-10-CM

## 2022-11-23 DIAGNOSIS — M75.32 CALCIFIC TENDONITIS OF LEFT SHOULDER: Primary | ICD-10-CM

## 2022-11-23 RX ORDER — ICOSAPENT ETHYL 1000 MG/1
2 CAPSULE ORAL 2 TIMES DAILY
COMMUNITY
Start: 2022-11-10

## 2022-11-23 RX ORDER — CIPROFLOXACIN 500 MG/1
500 TABLET, FILM COATED ORAL 2 TIMES DAILY
COMMUNITY

## 2022-11-23 NOTE — PROGRESS NOTES
Assessment/Plan:  1  Calcific tendonitis of left shoulder  US Guided Calcium Removal Shoulder - Tenex      2  Left shoulder pain, unspecified chronicity  XR shoulder 2+ vw left        Scribe Attestation    I,:  Mayra Starks am acting as a scribe while in the presence of the attending physician :       I,:  Teresa Whittaker MD personally performed the services described in this documentation    as scribed in my presence :         Nicole Cavazos upon exam today and review of his x-rays does demonstrates and symptoms consistent with calcific tendinitis of his left shoulder  He does present with good overall strength and functional range of motion on exam today, however does denote soreness globally with my exam   His left shoulder x-rays do display small calcium deposit in the rotator cuff insertion  I do think he would benefit from ultrasound-guided lavage to remove the calcific tendinitis  I explained this procedure will likely reduce his symptoms  Nicole Cavazos verbalized understanding and was amenable to this treatment plan  He will follow up with me in approximately 3 weeks after his lavage for repeat clinical evaluation  If he demonstrates improvements following this procedure I would like him to begin physical therapy  Subjective:   Torie Younger is a 50 y o  male who presents for initial evaluation of his left shoulder  He has had pain in the shoulder for approximately 3-4 months  Pain is located in the anterior shoulder and sometimes extends to the elbow  He complains his sleep is most affected by his symptoms, constantly waking him up and feeling unable to find a comfortable sleeping position  His does work a very physically demanding job in which he has noticed difficulty performing overhead motions  He also states pronation and supination motions do cause pain  He has also tried to stay physically active and workout but has had difficulty due to the demands of his job as well as pain       Review of Systems   Constitutional: Negative for chills, fever and unexpected weight change  HENT: Negative for nosebleeds and sore throat  Eyes: Negative for pain, redness and visual disturbance  Respiratory: Negative for cough, shortness of breath and wheezing  Cardiovascular: Negative for chest pain, palpitations and leg swelling  Gastrointestinal: Negative for nausea and vomiting  Endocrine: Negative for polydipsia and polyuria  Genitourinary: Negative for dysuria and hematuria  Musculoskeletal: Positive for arthralgias and myalgias  Negative for neck pain  As noted in HPI   Skin: Negative for rash and wound  Neurological: Negative for dizziness, numbness and headaches  Psychiatric/Behavioral: Negative for decreased concentration  The patient is not nervous/anxious            Past Medical History:   Diagnosis Date   • Anxiety    • Fractures     Guestimate   • Hypercholesteremia    • Irritable bowel syndrome    • Right-sided Bell's palsy        Past Surgical History:   Procedure Laterality Date   • COLONOSCOPY     • CYST REMOVAL     • KNEE SURGERY Right     infection       Family History   Adopted: Yes   Problem Relation Age of Onset   • Tuberculosis Mother        Social History     Occupational History   • Not on file   Tobacco Use   • Smoking status: Former     Packs/day: 0 50     Years: 17 00     Pack years: 8 50     Types: Cigarettes     Start date: 1990     Quit date: 2006     Years since quittin 2   • Smokeless tobacco: Never   Vaping Use   • Vaping Use: Never used   Substance and Sexual Activity   • Alcohol use: Not Currently     Comment: rare   • Drug use: Never   • Sexual activity: Yes     Partners: Female         Current Outpatient Medications:   •  ALPRAZolam (XANAX) 0 25 mg tablet, Take by mouth as needed for anxiety, Disp: , Rfl:   •  ciprofloxacin (CIPRO) 500 mg tablet, Take 500 mg by mouth 2 (two) times a day, Disp: , Rfl:   •  Icosapent Ethyl 1 g CAPS, Take 2 capsules by mouth 2 (two) times a day, Disp: , Rfl:   •  Multiple Vitamin (multivitamin) capsule, Take 1 capsule by mouth daily, Disp: , Rfl:   •  rosuvastatin (CRESTOR) 10 MG tablet, Take 10 mg by mouth daily, Disp: , Rfl:     Allergies   Allergen Reactions   • Other Itching     Seasonal allergies, runny nose       Objective:  Vitals:    11/23/22 0700   BP: 127/85   Pulse: 67       Left Shoulder Exam     Tenderness   The patient is experiencing tenderness in the biceps tendon  Range of Motion   Active abduction: 150   External rotation: 60   Forward flexion: 150   Internal rotation 0 degrees: L4     Muscle Strength   Abduction: 5/5   Internal rotation: 5/5   External rotation: 5/5   Supraspinatus: 5/5   Subscapularis: 5/5   Biceps: 4/5     Other   Sensation: normal  Pulse: present     Comments:  Speeds: positive            Physical Exam  HENT:      Head: Normocephalic and atraumatic  Eyes:      General:         Right eye: No discharge  Left eye: No discharge  Conjunctiva/sclera: Conjunctivae normal       Pupils: Pupils are equal, round, and reactive to light  Cardiovascular:      Rate and Rhythm: Normal rate  Pulmonary:      Effort: Pulmonary effort is normal  No respiratory distress  Musculoskeletal:         General: No swelling or tenderness  Normal range of motion  Cervical back: Normal range of motion and neck supple  Comments: As noted in HPI   Skin:     General: Skin is warm and dry  Neurological:      Mental Status: He is alert and oriented to person, place, and time  I have personally reviewed pertinent films in PACS and my interpretation is as follows:  X-ray of the left shoulder obtained today 11/23/2022 demonstrates mild osteoarthritis of the glenohumeral joint in calcific tendinitis the rotator cuff tendon insertion  Otherwise x-rays display acceptable structural alignment and no obvious signs of acute fracture        This document was created using speech voice recognition software  Grammatical errors, random word insertions, pronoun errors, and incomplete sentences are an occasional consequence of this system due to software limitations, ambient noise, and hardware issues  Any formal questions or concerns about content, text, or information contained within the body of this dictation should be directly addressed to the provider for clarification

## 2022-11-28 NOTE — TELEPHONE ENCOUNTER
Called pt and let him know that we do not have any pull with procedure scheduling, that we cannot get him in sooner  I advised that he call central scheduling periodically to see if there have been any cancellations and possibly offer to go elsewhere in the network to get in sooner  Pt verbalized understanding  Pt was boni for 12/16 f/u with Kelby, we rescheduled this to coincide with his procedure appointment in January

## 2023-01-05 ENCOUNTER — HOSPITAL ENCOUNTER (OUTPATIENT)
Dept: RADIOLOGY | Facility: HOSPITAL | Age: 53
Discharge: HOME | End: 2023-01-05
Attending: ORTHOPAEDIC SURGERY

## 2023-02-07 ENCOUNTER — HOSPITAL ENCOUNTER (OUTPATIENT)
Dept: RADIOLOGY | Facility: HOSPITAL | Age: 53
Discharge: HOME | End: 2023-02-07
Attending: ORTHOPAEDIC SURGERY

## 2023-02-07 DIAGNOSIS — M75.32 CALCIFIC TENDONITIS OF LEFT SHOULDER: ICD-10-CM

## 2023-02-07 RX ORDER — LIDOCAINE HYDROCHLORIDE 10 MG/ML
5 INJECTION, SOLUTION EPIDURAL; INFILTRATION; INTRACAUDAL; PERINEURAL ONCE
Status: COMPLETED | OUTPATIENT
Start: 2023-02-07 | End: 2023-02-07

## 2023-02-07 RX ORDER — METHYLPREDNISOLONE ACETATE 80 MG/ML
80 INJECTION, SUSPENSION INTRA-ARTICULAR; INTRALESIONAL; INTRAMUSCULAR; SOFT TISSUE ONCE
Status: COMPLETED | OUTPATIENT
Start: 2023-02-07 | End: 2023-02-07

## 2023-02-07 RX ORDER — BUPIVACAINE HYDROCHLORIDE 2.5 MG/ML
10 INJECTION, SOLUTION EPIDURAL; INFILTRATION; INTRACAUDAL ONCE
Status: COMPLETED | OUTPATIENT
Start: 2023-02-07 | End: 2023-02-07

## 2023-02-07 RX ADMIN — BUPIVACAINE HYDROCHLORIDE 10 ML: 2.5 INJECTION, SOLUTION EPIDURAL; INFILTRATION; INTRACAUDAL; PERINEURAL at 13:53

## 2023-02-07 RX ADMIN — METHYLPREDNISOLONE ACETATE 80 MG: 80 INJECTION, SUSPENSION INTRA-ARTICULAR; INTRALESIONAL; INTRAMUSCULAR; SOFT TISSUE at 13:53

## 2023-02-07 RX ADMIN — LIDOCAINE HYDROCHLORIDE 5 ML: 10 INJECTION, SOLUTION EPIDURAL; INFILTRATION; INTRACAUDAL; PERINEURAL at 13:41

## 2023-02-17 ENCOUNTER — OFFICE VISIT (OUTPATIENT)
Dept: OBGYN CLINIC | Facility: CLINIC | Age: 53
End: 2023-02-17

## 2023-02-17 VITALS
TEMPERATURE: 98 F | WEIGHT: 178 LBS | BODY MASS INDEX: 26.98 KG/M2 | SYSTOLIC BLOOD PRESSURE: 126 MMHG | HEIGHT: 68 IN | HEART RATE: 65 BPM | DIASTOLIC BLOOD PRESSURE: 84 MMHG

## 2023-02-17 DIAGNOSIS — M19.012 PRIMARY OSTEOARTHRITIS OF LEFT SHOULDER: Primary | ICD-10-CM

## 2023-02-17 RX ORDER — TADALAFIL 2.5 MG/1
2.5 TABLET ORAL DAILY
COMMUNITY
Start: 2023-02-02

## 2023-02-17 RX ORDER — TAMSULOSIN HYDROCHLORIDE 0.4 MG/1
CAPSULE ORAL
COMMUNITY
Start: 2023-02-13

## 2024-01-02 ENCOUNTER — APPOINTMENT (EMERGENCY)
Dept: RADIOLOGY | Facility: HOSPITAL | Age: 54
End: 2024-01-02
Payer: COMMERCIAL

## 2024-01-02 ENCOUNTER — HOSPITAL ENCOUNTER (EMERGENCY)
Facility: HOSPITAL | Age: 54
Discharge: HOME | End: 2024-01-02
Attending: EMERGENCY MEDICINE
Payer: COMMERCIAL

## 2024-01-02 VITALS
RESPIRATION RATE: 18 BRPM | OXYGEN SATURATION: 97 % | HEART RATE: 105 BPM | TEMPERATURE: 97.8 F | DIASTOLIC BLOOD PRESSURE: 97 MMHG | SYSTOLIC BLOOD PRESSURE: 149 MMHG

## 2024-01-02 DIAGNOSIS — R10.9 FLANK PAIN: Primary | ICD-10-CM

## 2024-01-02 LAB
ALBUMIN SERPL BCP-MCNC: 4.9 G/DL (ref 3.5–5)
ALP SERPL-CCNC: 40 U/L (ref 34–104)
ALT SERPL W P-5'-P-CCNC: 33 U/L (ref 7–52)
ANION GAP SERPL CALCULATED.3IONS-SCNC: 9 MMOL/L
APTT PPP: 30 SECONDS (ref 23–37)
AST SERPL W P-5'-P-CCNC: 25 U/L (ref 13–39)
BASOPHILS # BLD AUTO: 0.03 THOUSANDS/ÂΜL (ref 0–0.1)
BASOPHILS NFR BLD AUTO: 0 % (ref 0–1)
BILIRUB SERPL-MCNC: 0.9 MG/DL (ref 0.2–1)
BILIRUB UR QL STRIP: NEGATIVE
BUN SERPL-MCNC: 9 MG/DL (ref 5–25)
CALCIUM SERPL-MCNC: 9.3 MG/DL (ref 8.4–10.2)
CHLORIDE SERPL-SCNC: 100 MMOL/L (ref 96–108)
CLARITY UR: CLEAR
CO2 SERPL-SCNC: 29 MMOL/L (ref 21–32)
COLOR UR: YELLOW
CREAT SERPL-MCNC: 1.09 MG/DL (ref 0.6–1.3)
EOSINOPHIL # BLD AUTO: 0.31 THOUSAND/ÂΜL (ref 0–0.61)
EOSINOPHIL NFR BLD AUTO: 4 % (ref 0–6)
ERYTHROCYTE [DISTWIDTH] IN BLOOD BY AUTOMATED COUNT: 12.2 % (ref 11.6–15.1)
GFR SERPL CREATININE-BSD FRML MDRD: 78 ML/MIN/1.73SQ M
GLUCOSE SERPL-MCNC: 111 MG/DL (ref 65–140)
GLUCOSE UR STRIP-MCNC: NEGATIVE MG/DL
HCT VFR BLD AUTO: 53.2 % (ref 36.5–49.3)
HGB BLD-MCNC: 18.2 G/DL (ref 12–17)
HGB UR QL STRIP.AUTO: NEGATIVE
IMM GRANULOCYTES # BLD AUTO: 0.01 THOUSAND/UL (ref 0–0.2)
IMM GRANULOCYTES NFR BLD AUTO: 0 % (ref 0–2)
INR PPP: 1.03 (ref 0.84–1.19)
KETONES UR STRIP-MCNC: ABNORMAL MG/DL
LEUKOCYTE ESTERASE UR QL STRIP: NEGATIVE
LIPASE SERPL-CCNC: 9 U/L (ref 11–82)
LYMPHOCYTES # BLD AUTO: 1.94 THOUSANDS/ÂΜL (ref 0.6–4.47)
LYMPHOCYTES NFR BLD AUTO: 26 % (ref 14–44)
MAGNESIUM SERPL-MCNC: 2.1 MG/DL (ref 1.9–2.7)
MCH RBC QN AUTO: 30 PG (ref 26.8–34.3)
MCHC RBC AUTO-ENTMCNC: 34.2 G/DL (ref 31.4–37.4)
MCV RBC AUTO: 88 FL (ref 82–98)
MONOCYTES # BLD AUTO: 0.63 THOUSAND/ÂΜL (ref 0.17–1.22)
MONOCYTES NFR BLD AUTO: 8 % (ref 4–12)
NEUTROPHILS # BLD AUTO: 4.63 THOUSANDS/ÂΜL (ref 1.85–7.62)
NEUTS SEG NFR BLD AUTO: 62 % (ref 43–75)
NITRITE UR QL STRIP: NEGATIVE
NRBC BLD AUTO-RTO: 0 /100 WBCS
PH UR STRIP.AUTO: 6 [PH]
PLATELET # BLD AUTO: 241 THOUSANDS/UL (ref 149–390)
PMV BLD AUTO: 9.7 FL (ref 8.9–12.7)
POTASSIUM SERPL-SCNC: 3.7 MMOL/L (ref 3.5–5.3)
PROT SERPL-MCNC: 7.8 G/DL (ref 6.4–8.4)
PROT UR STRIP-MCNC: NEGATIVE MG/DL
PROTHROMBIN TIME: 13.7 SECONDS (ref 11.6–14.5)
RBC # BLD AUTO: 6.06 MILLION/UL (ref 3.88–5.62)
SODIUM SERPL-SCNC: 138 MMOL/L (ref 135–147)
SP GR UR STRIP.AUTO: 1.01 (ref 1–1.03)
UROBILINOGEN UR QL STRIP.AUTO: 0.2 E.U./DL
WBC # BLD AUTO: 7.55 THOUSAND/UL (ref 4.31–10.16)

## 2024-01-02 PROCEDURE — 85730 THROMBOPLASTIN TIME PARTIAL: CPT | Performed by: EMERGENCY MEDICINE

## 2024-01-02 PROCEDURE — 36415 COLL VENOUS BLD VENIPUNCTURE: CPT | Performed by: EMERGENCY MEDICINE

## 2024-01-02 PROCEDURE — 83690 ASSAY OF LIPASE: CPT | Performed by: EMERGENCY MEDICINE

## 2024-01-02 PROCEDURE — 80053 COMPREHEN METABOLIC PANEL: CPT | Performed by: EMERGENCY MEDICINE

## 2024-01-02 PROCEDURE — 96361 HYDRATE IV INFUSION ADD-ON: CPT

## 2024-01-02 PROCEDURE — 87086 URINE CULTURE/COLONY COUNT: CPT | Performed by: EMERGENCY MEDICINE

## 2024-01-02 PROCEDURE — 85610 PROTHROMBIN TIME: CPT | Performed by: EMERGENCY MEDICINE

## 2024-01-02 PROCEDURE — G1004 CDSM NDSC: HCPCS

## 2024-01-02 PROCEDURE — 83735 ASSAY OF MAGNESIUM: CPT | Performed by: EMERGENCY MEDICINE

## 2024-01-02 PROCEDURE — 99284 EMERGENCY DEPT VISIT MOD MDM: CPT | Performed by: EMERGENCY MEDICINE

## 2024-01-02 PROCEDURE — 96374 THER/PROPH/DIAG INJ IV PUSH: CPT

## 2024-01-02 PROCEDURE — 81003 URINALYSIS AUTO W/O SCOPE: CPT | Performed by: EMERGENCY MEDICINE

## 2024-01-02 PROCEDURE — 96375 TX/PRO/DX INJ NEW DRUG ADDON: CPT

## 2024-01-02 PROCEDURE — 99284 EMERGENCY DEPT VISIT MOD MDM: CPT

## 2024-01-02 PROCEDURE — 85025 COMPLETE CBC W/AUTO DIFF WBC: CPT | Performed by: EMERGENCY MEDICINE

## 2024-01-02 PROCEDURE — 74176 CT ABD & PELVIS W/O CONTRAST: CPT

## 2024-01-02 RX ORDER — KETOROLAC TROMETHAMINE 30 MG/ML
15 INJECTION, SOLUTION INTRAMUSCULAR; INTRAVENOUS ONCE
Status: COMPLETED | OUTPATIENT
Start: 2024-01-02 | End: 2024-01-02

## 2024-01-02 RX ORDER — CYCLOBENZAPRINE HCL 10 MG
10 TABLET ORAL 2 TIMES DAILY PRN
Qty: 10 TABLET | Refills: 0 | Status: SHIPPED | OUTPATIENT
Start: 2024-01-02 | End: 2024-01-07

## 2024-01-02 RX ORDER — PREDNISONE 20 MG/1
60 TABLET ORAL DAILY
Qty: 15 TABLET | Refills: 0 | Status: SHIPPED | OUTPATIENT
Start: 2024-01-02 | End: 2024-01-02

## 2024-01-02 RX ORDER — LIDOCAINE 50 MG/G
1 PATCH TOPICAL DAILY
Qty: 2 PATCH | Refills: 0 | Status: SHIPPED | OUTPATIENT
Start: 2024-01-02 | End: 2024-01-04

## 2024-01-02 RX ORDER — PREDNISONE 20 MG/1
60 TABLET ORAL DAILY
Qty: 15 TABLET | Refills: 0 | Status: SHIPPED | OUTPATIENT
Start: 2024-01-02 | End: 2024-01-07

## 2024-01-02 RX ORDER — LIDOCAINE 50 MG/G
1 PATCH TOPICAL DAILY
Qty: 2 PATCH | Refills: 0 | Status: SHIPPED | OUTPATIENT
Start: 2024-01-02 | End: 2024-01-02

## 2024-01-02 RX ORDER — HYDROMORPHONE HCL/PF 1 MG/ML
0.5 SYRINGE (ML) INJECTION ONCE
Status: COMPLETED | OUTPATIENT
Start: 2024-01-02 | End: 2024-01-02

## 2024-01-02 RX ORDER — CYCLOBENZAPRINE HCL 10 MG
10 TABLET ORAL 2 TIMES DAILY PRN
Qty: 10 TABLET | Refills: 0 | Status: SHIPPED | OUTPATIENT
Start: 2024-01-02 | End: 2024-01-02

## 2024-01-02 RX ADMIN — SODIUM CHLORIDE 1000 ML: 0.9 INJECTION, SOLUTION INTRAVENOUS at 08:12

## 2024-01-02 RX ADMIN — KETOROLAC TROMETHAMINE 15 MG: 30 INJECTION, SOLUTION INTRAMUSCULAR at 08:11

## 2024-01-02 RX ADMIN — HYDROMORPHONE HYDROCHLORIDE 0.5 MG: 1 INJECTION, SOLUTION INTRAMUSCULAR; INTRAVENOUS; SUBCUTANEOUS at 08:09

## 2024-01-02 NOTE — DISCHARGE INSTRUCTIONS
Flexeril will make you drowsy would be mindful  Do not lift heavy things  Look out for blistery rash in the next couple of days if you notice could be shingles  Ice and warm compresses on the area

## 2024-01-02 NOTE — ED PROVIDER NOTES
History  Chief Complaint   Patient presents with    Flank Pain     Left sided flank pain and ab pain starting sunday     53-year-old male presents with left-sided flank pain rating to left lower abdomen sharp continuous ongoing for couple weeks getting progressively worse worse with movement denies any dysuria urgency frequency materia nausea vomiting diarrhea constipation or any other symptoms.  Your pain is 7 out of 10 nothing makes it better movement makes it worse.      History provided by:  Patient   used: No        Prior to Admission Medications   Prescriptions Last Dose Informant Patient Reported? Taking?   ALPRAZolam (XANAX) 0.25 mg tablet   Yes No   Sig: Take by mouth as needed for anxiety   Icosapent Ethyl 1 g CAPS   Yes No   Sig: Take 2 capsules by mouth 2 (two) times a day   Multiple Vitamin (multivitamin) capsule   Yes No   Sig: Take 1 capsule by mouth daily   ciprofloxacin (CIPRO) 500 mg tablet   Yes No   Sig: Take 500 mg by mouth 2 (two) times a day   rosuvastatin (CRESTOR) 10 MG tablet   Yes No   Sig: Take 10 mg by mouth daily   tadalafil (CIALIS) 2.5 MG tablet   Yes No   Sig: Take 2.5 mg by mouth daily   tamsulosin (FLOMAX) 0.4 mg   Yes No      Facility-Administered Medications: None       Past Medical History:   Diagnosis Date    Anxiety     Fractures 1988    Guestimate    Hypercholesteremia     Irritable bowel syndrome     Right-sided Bell's palsy        Past Surgical History:   Procedure Laterality Date    COLONOSCOPY      CYST REMOVAL      KNEE SURGERY Right 2018    infection    US GUIDED MSK PROCEDURE  2/7/2023       Family History   Adopted: Yes   Problem Relation Age of Onset    Tuberculosis Mother      I have reviewed and agree with the history as documented.    E-Cigarette/Vaping    E-Cigarette Use Never User      E-Cigarette/Vaping Substances    Nicotine No     THC No     CBD No     Flavoring No     Other No     Unknown No      Social History     Tobacco Use    Smoking  status: Former     Current packs/day: 0.00     Average packs/day: 0.5 packs/day for 17.0 years (8.5 ttl pk-yrs)     Types: Cigarettes     Start date: 1990     Quit date: 2006     Years since quittin.3    Smokeless tobacco: Never   Vaping Use    Vaping status: Never Used   Substance Use Topics    Alcohol use: Not Currently     Comment: rare    Drug use: Never       Review of Systems   Constitutional: Negative.    HENT: Negative.     Eyes: Negative.    Respiratory: Negative.     Cardiovascular: Negative.    Gastrointestinal:  Positive for abdominal pain.   Endocrine: Negative.    Genitourinary:  Positive for flank pain.   Skin: Negative.    Allergic/Immunologic: Negative.    Neurological: Negative.    Hematological: Negative.    Psychiatric/Behavioral: Negative.     All other systems reviewed and are negative.      Physical Exam  Physical Exam  Vitals and nursing note reviewed.   Constitutional:       Appearance: Normal appearance.   HENT:      Head: Normocephalic and atraumatic.      Nose: Nose normal.      Mouth/Throat:      Mouth: Mucous membranes are moist.   Eyes:      Extraocular Movements: Extraocular movements intact.      Pupils: Pupils are equal, round, and reactive to light.   Cardiovascular:      Rate and Rhythm: Normal rate and regular rhythm.   Pulmonary:      Effort: Pulmonary effort is normal.      Breath sounds: Normal breath sounds.   Abdominal:      General: Abdomen is flat. Bowel sounds are normal.      Palpations: Abdomen is soft.   Musculoskeletal:         General: Normal range of motion.      Cervical back: Normal range of motion and neck supple.      Comments: Left-sided CVA tenderness left-sided lower abdominal tenderness left lumbar paraspinal tenderness noted.  No midline tenderness no step-offs   Skin:     General: Skin is warm.      Capillary Refill: Capillary refill takes less than 2 seconds.   Neurological:      General: No focal deficit present.      Mental Status: He is  alert and oriented to person, place, and time. Mental status is at baseline.   Psychiatric:         Mood and Affect: Mood normal.         Thought Content: Thought content normal.         Vital Signs  ED Triage Vitals   Temperature Pulse Respirations Blood Pressure SpO2   01/02/24 0741 01/02/24 0741 01/02/24 0741 01/02/24 0741 01/02/24 0741   97.8 °F (36.6 °C) 105 18 149/97 97 %      Temp Source Heart Rate Source Patient Position - Orthostatic VS BP Location FiO2 (%)   01/02/24 0741 01/02/24 0741 01/02/24 0741 01/02/24 0741 --   Oral Monitor Sitting Right arm       Pain Score       01/02/24 0809       6           Vitals:    01/02/24 0741   BP: 149/97   Pulse: 105   Patient Position - Orthostatic VS: Sitting         Visual Acuity      ED Medications  Medications   ketorolac (TORADOL) injection 15 mg (15 mg Intravenous Given 1/2/24 0811)   HYDROmorphone (DILAUDID) injection 0.5 mg (0.5 mg Intravenous Given 1/2/24 0809)   sodium chloride 0.9 % bolus 1,000 mL (0 mL Intravenous Stopped 1/2/24 1011)       Diagnostic Studies  Results Reviewed       Procedure Component Value Units Date/Time    Urine culture [[id number]] Collected: 01/02/24 0918    Lab Status: Final result Specimen: Urine, Clean Catch Updated: 01/03/24 0643     Urine Culture No Growth <1000 cfu/mL    UA (URINE) with reflex to Scope [[id number]]  (Abnormal) Collected: 01/02/24 0918    Lab Status: Final result Specimen: Urine, Clean Catch Updated: 01/02/24 0944     Color, UA Yellow     Clarity, UA Clear     Specific Gravity, UA 1.010     pH, UA 6.0     Leukocytes, UA Negative     Nitrite, UA Negative     Protein, UA Negative mg/dl      Glucose, UA Negative mg/dl      Ketones, UA Trace mg/dl      Urobilinogen, UA 0.2 E.U./dl      Bilirubin, UA Negative     Occult Blood, UA Negative    Comprehensive metabolic panel [[id number]] Collected: 01/02/24 0808    Lab Status: Final result Specimen: Blood from Arm, Left Updated: 01/02/24 0849     Sodium 138 mmol/L       Potassium 3.7 mmol/L      Chloride 100 mmol/L      CO2 29 mmol/L      ANION GAP 9 mmol/L      BUN 9 mg/dL      Creatinine 1.09 mg/dL      Glucose 111 mg/dL      Calcium 9.3 mg/dL      AST 25 U/L      ALT 33 U/L      Alkaline Phosphatase 40 U/L      Total Protein 7.8 g/dL      Albumin 4.9 g/dL      Total Bilirubin 0.90 mg/dL      eGFR 78 ml/min/1.73sq m     Narrative:      National Kid[employer] Disease Foundation guidelines for Chronic Kidney Disease (CKD):     Stage 1 with normal or high GFR (GFR > 90 mL/min/1.73 square meters)    Stage 2 Mild CKD (GFR = 60-89 mL/min/1.73 square meters)    Stage 3A Moderate CKD (GFR = 45-59 mL/min/1.73 square meters)    Stage 3B Moderate CKD (GFR = 30-44 mL/min/1.73 square meters)    Stage 4 Severe CKD (GFR = 15-29 mL/min/1.73 square meters)    Stage 5 End Stage CKD (GFR <15 mL/min/1.73 square meters)  Note: GFR calculation is accurate only with a steady state creatinine    Magnesium [[id number]]  (Normal) Collected: 01/02/24 0808    Lab Status: Final result Specimen: Blood from Arm, Left Updated: 01/02/24 0849     Magnesium 2.1 mg/dL     Lipase [[id number]]  (Abnormal) Collected: 01/02/24 0808    Lab Status: Final result Specimen: Blood from Arm, Left Updated: 01/02/24 0849     Lipase 9 u/L     Protime-INR [[id number]]  (Normal) Collected: 01/02/24 0808    Lab Status: Final result Specimen: Blood from Arm, Left Updated: 01/02/24 0847     Protime 13.7 seconds      INR 1.03    APTT [[id number]]  (Normal) Collected: 01/02/24 0808    Lab Status: Final result Specimen: Blood from Arm, Left Updated: 01/02/24 0847     PTT 30 seconds     CBC and differential [[id number]]  (Abnormal) Collected: 01/02/24 0808    Lab Status: Final result Specimen: Blood from Arm, Left Updated: 01/02/24 0820     WBC 7.55 Thousand/uL      RBC 6.06 Million/uL      Hemoglobin 18.2 g/dL      Hematocrit 53.2 %      MCV 88 fL      MCH 30.0 pg      MCHC 34.2 g/dL      RDW 12.2 %      MPV 9.7 fL      Platelets 241 Thousands/uL       nRBC 0 /100 WBCs      Neutrophils Relative 62 %      Immat GRANS % 0 %      Lymphocytes Relative 26 %      Monocytes Relative 8 %      Eosinophils Relative 4 %      Basophils Relative 0 %      Neutrophils Absolute 4.63 Thousands/µL      Immature Grans Absolute 0.01 Thousand/uL      Lymphocytes Absolute 1.94 Thousands/µL      Monocytes Absolute 0.63 Thousand/µL      Eosinophils Absolute 0.31 Thousand/µL      Basophils Absolute 0.03 Thousands/µL                    CT renal stone study abdomen pelvis wo contrast   Final Result by Ranjeet Hernandez MD (01/02 0849)      No acute findings in the abdomen or pelvis.            Workstation performed: [id number]                    Procedures  Procedures         ED Course                                             Medical Decision Making  Patient evaluated with labs UA imaging.  I reviewed the results and discussed them with the patient.  Patient discharged with appropriate instructions medications and follow-up.  Patient verbalized understanding had no further questions at the time of discharge.  Patient had stable vital signs and well-appearing at the time of discharge.    Problems Addressed:  Flank pain: acute illness or injury    Amount and/or Complexity of Data Reviewed  External Data Reviewed: notes.  Labs: ordered. Decision-making details documented in ED Course.  Radiology: ordered. Decision-making details documented in ED Course.    Risk  Prescription drug management.             Disposition  Final diagnoses:   Flank pain     Time reflects when diagnosis was documented in both MDM as applicable and the Disposition within this note       Time User Action Codes Description Comment    1/2/2024 10:20 AM Destiny Love Add [R10.9] Flank pain           ED Disposition       ED Disposition   Discharge    Condition   Stable    Date/Time   Tue Jan 2, 2024 10:19 AM    Comment   Fermín Rosales discharge to home/self care.                   Follow-up Information        Follow up With Specialties Details Why Contact Info Additional Information    Jelani Glynn DO Family Medicine Schedule an appointment as soon as possible for a visit   [facility]  [address] [place] [id number] [phone]       [facility] Emergency Department Emergency Medicine  If symptoms worsen [address] [facility] [id number] [phone] [facility] Emergency Department, [address] [place], [place], [address]            Discharge Medication List as of 1/2/2024 10:21 AM        START taking these medications    Details   cyclobenzaprine (FLEXERIL) 10 mg tablet Take 1 tablet (10 mg total) by mouth 2 (two) times a day as needed for muscle spasms for up to 5 days, Starting Tue 1/2/2024, Until Sun 1/7/2024 at 2359, Normal      lidocaine (LIDODERM) 5 % Apply 1 patch topically over 12 hours daily for 2 days Remove & Discard patch within 12 hours or as directed by MD, Starting Tue 1/2/2024, Until Thu 1/4/2024, Normal      predniSONE 20 mg tablet Take 3 tablets (60 mg total) by mouth daily for 5 days, Starting Tue 1/2/2024, Until Sun 1/7/2024, Normal           CONTINUE these medications which have NOT CHANGED    Details   ALPRAZolam (XANAX) 0.25 mg tablet Take by mouth as needed for anxiety, Historical Med      ciprofloxacin (CIPRO) 500 mg tablet Take 500 mg by mouth 2 (two) times a day, Historical Med      Icosapent Ethyl 1 g CAPS Take 2 capsules by mouth 2 (two) times a day, Starting Thu 11/10/2022, Historical Med      Multiple Vitamin (multivitamin) capsule Take 1 capsule by mouth daily, Historical Med      rosuvastatin (CRESTOR) 10 MG tablet Take 10 mg by mouth daily, Historical Med      tadalafil (CIALIS) 2.5 MG tablet Take 2.5 mg by mouth daily, Starting Thu 2/2/2023, Historical Med      tamsulosin (FLOMAX) 0.4 mg Starting Mon 2/13/2023, Historical Med             No discharge procedures on file.    PDMP Review       None            ED  Provider  Electronically Signed by             Destiny Love,   01/03/24 1058

## 2024-01-03 LAB — BACTERIA UR CULT: NORMAL

## 2024-10-08 NOTE — TELEPHONE ENCOUNTER
Caller: Patient    Doctor: Kelby    Reason for call:     Patient is calling to make the dr aware he scheduled the    US Guided Calcium Removal Shoulder - Tenex for 1/5/23  He was hoping to have it done this year, if there is anyway this can be done this year, he would be thankful  Did the office do the pre authorization already for this procedure      Call back#: [phone] Private car